# Patient Record
Sex: MALE | Race: WHITE | NOT HISPANIC OR LATINO | Employment: FULL TIME | URBAN - METROPOLITAN AREA
[De-identification: names, ages, dates, MRNs, and addresses within clinical notes are randomized per-mention and may not be internally consistent; named-entity substitution may affect disease eponyms.]

---

## 2024-08-05 ENCOUNTER — APPOINTMENT (OUTPATIENT)
Dept: OTHER | Facility: HOSPITAL | Age: 54
End: 2024-08-05
Payer: COMMERCIAL

## 2024-08-05 ENCOUNTER — HOSPITAL ENCOUNTER (OUTPATIENT)
Facility: HOSPITAL | Age: 54
Setting detail: OBSERVATION
Discharge: HOME | End: 2024-08-06
Attending: INTERNAL MEDICINE | Admitting: PEDIATRICS
Payer: COMMERCIAL

## 2024-08-05 DIAGNOSIS — E78.5 DYSLIPIDEMIA: ICD-10-CM

## 2024-08-05 DIAGNOSIS — G45.9 TIA (TRANSIENT ISCHEMIC ATTACK): ICD-10-CM

## 2024-08-05 DIAGNOSIS — Z00.6 EXAMINATION FOR NORMAL COMPARISON FOR CLINICAL RESEARCH: Primary | ICD-10-CM

## 2024-08-05 LAB — GLUCOSE BLDC GLUCOMTR-MCNC: 181 MG/DL (ref 70–130)

## 2024-08-05 PROCEDURE — G0379 DIRECT REFER HOSPITAL OBSERV: HCPCS

## 2024-08-05 PROCEDURE — 99204 OFFICE O/P NEW MOD 45 MIN: CPT

## 2024-08-05 PROCEDURE — G0378 HOSPITAL OBSERVATION PER HR: HCPCS

## 2024-08-05 PROCEDURE — 82948 REAGENT STRIP/BLOOD GLUCOSE: CPT

## 2024-08-05 PROCEDURE — 99222 1ST HOSP IP/OBS MODERATE 55: CPT | Performed by: INTERNAL MEDICINE

## 2024-08-05 RX ORDER — SODIUM CHLORIDE 9 MG/ML
40 INJECTION, SOLUTION INTRAVENOUS AS NEEDED
Status: DISCONTINUED | OUTPATIENT
Start: 2024-08-05 | End: 2024-08-05 | Stop reason: SDUPTHER

## 2024-08-05 RX ORDER — POLYETHYLENE GLYCOL 3350 17 G/17G
17 POWDER, FOR SOLUTION ORAL DAILY PRN
Status: DISCONTINUED | OUTPATIENT
Start: 2024-08-05 | End: 2024-08-06 | Stop reason: HOSPADM

## 2024-08-05 RX ORDER — ASPIRIN 81 MG/1
81 TABLET, CHEWABLE ORAL DAILY
Status: DISCONTINUED | OUTPATIENT
Start: 2024-08-06 | End: 2024-08-06 | Stop reason: HOSPADM

## 2024-08-05 RX ORDER — ASPIRIN 300 MG/1
300 SUPPOSITORY RECTAL DAILY
Status: DISCONTINUED | OUTPATIENT
Start: 2024-08-06 | End: 2024-08-06 | Stop reason: HOSPADM

## 2024-08-05 RX ORDER — BISACODYL 5 MG/1
5 TABLET, DELAYED RELEASE ORAL DAILY PRN
Status: DISCONTINUED | OUTPATIENT
Start: 2024-08-05 | End: 2024-08-06 | Stop reason: HOSPADM

## 2024-08-05 RX ORDER — SODIUM CHLORIDE 9 MG/ML
40 INJECTION, SOLUTION INTRAVENOUS AS NEEDED
Status: DISCONTINUED | OUTPATIENT
Start: 2024-08-05 | End: 2024-08-06 | Stop reason: HOSPADM

## 2024-08-05 RX ORDER — AMOXICILLIN 250 MG
2 CAPSULE ORAL 2 TIMES DAILY PRN
Status: DISCONTINUED | OUTPATIENT
Start: 2024-08-05 | End: 2024-08-06 | Stop reason: HOSPADM

## 2024-08-05 RX ORDER — SODIUM CHLORIDE 0.9 % (FLUSH) 0.9 %
10 SYRINGE (ML) INJECTION AS NEEDED
Status: DISCONTINUED | OUTPATIENT
Start: 2024-08-05 | End: 2024-08-06 | Stop reason: HOSPADM

## 2024-08-05 RX ORDER — BISACODYL 10 MG
10 SUPPOSITORY, RECTAL RECTAL DAILY PRN
Status: DISCONTINUED | OUTPATIENT
Start: 2024-08-05 | End: 2024-08-06 | Stop reason: HOSPADM

## 2024-08-05 RX ORDER — ONDANSETRON 2 MG/ML
4 INJECTION INTRAMUSCULAR; INTRAVENOUS EVERY 6 HOURS PRN
Status: DISCONTINUED | OUTPATIENT
Start: 2024-08-05 | End: 2024-08-06 | Stop reason: HOSPADM

## 2024-08-05 RX ORDER — SODIUM CHLORIDE 0.9 % (FLUSH) 0.9 %
10 SYRINGE (ML) INJECTION EVERY 12 HOURS SCHEDULED
Status: DISCONTINUED | OUTPATIENT
Start: 2024-08-05 | End: 2024-08-06 | Stop reason: HOSPADM

## 2024-08-05 RX ORDER — CLOPIDOGREL BISULFATE 75 MG/1
75 TABLET ORAL DAILY
Status: DISCONTINUED | OUTPATIENT
Start: 2024-08-06 | End: 2024-08-06 | Stop reason: HOSPADM

## 2024-08-05 RX ORDER — ACETAMINOPHEN 325 MG/1
650 TABLET ORAL EVERY 4 HOURS PRN
Status: DISCONTINUED | OUTPATIENT
Start: 2024-08-05 | End: 2024-08-06 | Stop reason: HOSPADM

## 2024-08-05 RX ORDER — ATORVASTATIN CALCIUM 40 MG/1
80 TABLET, FILM COATED ORAL NIGHTLY
Status: DISCONTINUED | OUTPATIENT
Start: 2024-08-05 | End: 2024-08-06 | Stop reason: HOSPADM

## 2024-08-05 RX ORDER — NITROGLYCERIN 0.4 MG/1
0.4 TABLET SUBLINGUAL
Status: DISCONTINUED | OUTPATIENT
Start: 2024-08-05 | End: 2024-08-06 | Stop reason: HOSPADM

## 2024-08-05 RX ORDER — ONDANSETRON 4 MG/1
4 TABLET, ORALLY DISINTEGRATING ORAL EVERY 6 HOURS PRN
Status: DISCONTINUED | OUTPATIENT
Start: 2024-08-05 | End: 2024-08-06 | Stop reason: HOSPADM

## 2024-08-05 RX ADMIN — Medication 10 ML: at 20:52

## 2024-08-05 RX ADMIN — Medication 10 ML: at 20:55

## 2024-08-05 RX ADMIN — ACETAMINOPHEN 650 MG: 325 TABLET ORAL at 21:03

## 2024-08-05 RX ADMIN — ATORVASTATIN CALCIUM 80 MG: 40 TABLET, FILM COATED ORAL at 20:52

## 2024-08-05 NOTE — H&P
[facility] Medicine Services  HISTORY AND PHYSICAL    Patient Name: Tramaine Srivastava  : 1970  MRN: [id number]  Primary Care Physician: Provider, No Known  Date of admission: 2024      Subjective   Subjective     Chief Complaint:  L sided weakness    HPI:  Tramaine Srivastava is a 54 y.o. male with history of dyslipidemia, prior tobacco chewing admitted as a stroke rule out from [facility].  He is here with his wife visiting from [place] for family reunion.  He noted to have some heaviness of his LUE, and LLE - he is able to move them but with great difficulty.  He is also complaining of a headache at the onset of symptoms that is still present.     He travels to several different countries for work, he is supposed to go to [facility].      Personal History     Past Medical History:   Diagnosis Date    Arthritis     Hyperlipidemia            Past Surgical History:   Procedure Laterality Date    APPENDECTOMY      BACK SURGERY      CERVICAL SPINE SURGERY      HAND SURGERY      KNEE ARTHROSCOPY         Family History: family history is not on file.     Social History:  reports that he has never smoked. He has never been exposed to tobacco smoke. He has quit using smokeless tobacco.  His smokeless tobacco use included chew. He reports current alcohol use of about 7.0 standard drinks of alcohol per week. He reports that he does not use drugs.  Social History     Social History Narrative    Not on file       Medications:  Available home medication information reviewed.       No Known Allergies    Objective   Objective     Vital Signs:   Temp:  [97.7 °F (36.5 °C)] 97.7 °F (36.5 °C)  Resp:  [16] 16  BP: (141)/(86) 141/86       Physical Exam  Constitutional:       Appearance: Normal appearance.   HENT:      Head: Normocephalic and atraumatic.      Mouth/Throat:      Mouth: Mucous membranes are dry.   Eyes:      Pupils: Pupils are equal, round, and reactive to light.   Cardiovascular:       Rate and Rhythm: Regular rhythm. Bradycardia present.      Heart sounds: No murmur heard.  Pulmonary:      Effort: No respiratory distress.      Breath sounds: Normal breath sounds.   Abdominal:      General: There is distension.      Palpations: Abdomen is soft.   Musculoskeletal:         General: No swelling.   Skin:     General: Skin is warm and dry.   Neurological:      Mental Status: He is alert and oriented to person, place, and time.      Comments: Left UE weakness L LE weakness - complaining of heaviness and slowness with movement              Result Review:  I have personally reviewed the results from the time of this admission to 8/5/2024 18:24 EDT and agree with these findings:  [x]  Laboratory list / accordion  []  Microbiology  [x]  Radiology  []  EKG/Telemetry   []  Cardiology/Vascular   []  Pathology  [x]  Old records  []  Other:  Most notable findings include:       LAB RESULTS:      Lab 08/05/24  1202   PROTIME 11.0   INR 1.02   APTT 25.7             Lab 08/05/24  1202   LIPASE 43                         Microbiology Results (last 10 days)       ** No results found for the last 240 hours. **            CT Outside Films    Result Date: 8/5/2024  This procedure was auto-finalized with no dictation required.    CT Head Without Contrast    Result Date: 8/5/2024  CT HEAD AND NECK ANGIO,   WITHOUT AND  WITH CONTRAST, CT HEAD PERFUSION 8/5/2024 12:03 PM HISTORY: Carotid stenosis, syncope. CT HEAD PERFUSION TECHNIQUE: Multiple axial CT images were performed from the foramen magnum to the vertex. Limited dynamic postcontrast images were obtained. Calculations of cerebral blood flow, mean transit time, cerebral blood volume were performed and evaluated. This study was performed with techniques to keep radiation doses as low as reasonably achievable, (ALARA). Individualized dose reduction techniques using automated exposure control or adjustment of mA and/or kV according to the patient size were employed.  FINDINGS: Cerebral blood flow, mean transit time, cerebral blood volume demonstrate no evidence of large core infarct. There is reversibility in the left periventricular deep white matter posteriorly.    Impression: No large vessel occlusion. Reversibility as above. This may be related to ischemia or artifact. CTA HEAD AND NECK TECHNIQUE: Thin section axial CT with IV contrast supplemented with multiplanar 3D reconstructions of the head and neck. This study was performed with techniques to keep radiation doses as low as reasonably achievable, (ALARA). Individualized dose reduction techniques using automated exposure control or adjustment of mA and/or kV according to the patient size were employed. NASCET technique utilized for stenosis evaluation. FINDINGS: HEAD CT: The ventricles are normal in size. There is no evidence of hemorrhage. No masses are identified. No extra-axial fluid is seen. The sinuses are normal. CTA: AORTIC ARCH:  Arch shows no significant narrowing. Great vessel origins are widely patent. RIGHT CAROTID:  No significant stenosis is seen of the cervical common or internal carotid artery. LEFT CAROTID:  No significant stenosis is seen of the cervical common or internal carotid artery. VERTEBRALS: The vertebrals are patent. No significant stenosis is present. The cranial circulation is unremarkable. There is no significant stenosis, aneurysm, or occlusion. IMPRESSION:  No acute process. The ED was notified at  8/5/2024 12:39 PM Images reviewed, interpreted, and dictated by DOROTHY Mcgee MD    CTA NECK / BRAIN STROKE PROTOCOL    Result Date: 8/5/2024  CT HEAD AND NECK ANGIO,   WITHOUT AND  WITH CONTRAST, CT HEAD PERFUSION 8/5/2024 12:03 PM HISTORY: Carotid stenosis, syncope. CT HEAD PERFUSION TECHNIQUE: Multiple axial CT images were performed from the foramen magnum to the vertex. Limited dynamic postcontrast images were obtained. Calculations of cerebral blood flow, mean transit time,  cerebral blood volume were performed and evaluated. This study was performed with techniques to keep radiation doses as low as reasonably achievable, (ALARA). Individualized dose reduction techniques using automated exposure control or adjustment of mA and/or kV according to the patient size were employed. FINDINGS: Cerebral blood flow, mean transit time, cerebral blood volume demonstrate no evidence of large core infarct. There is reversibility in the left periventricular deep white matter posteriorly.    Impression: No large vessel occlusion. Reversibility as above. This may be related to ischemia or artifact. CTA HEAD AND NECK TECHNIQUE: Thin section axial CT with IV contrast supplemented with multiplanar 3D reconstructions of the head and neck. This study was performed with techniques to keep radiation doses as low as reasonably achievable, (ALARA). Individualized dose reduction techniques using automated exposure control or adjustment of mA and/or kV according to the patient size were employed. NASCET technique utilized for stenosis evaluation. FINDINGS: HEAD CT: The ventricles are normal in size. There is no evidence of hemorrhage. No masses are identified. No extra-axial fluid is seen. The sinuses are normal. CTA: AORTIC ARCH:  Arch shows no significant narrowing. Great vessel origins are widely patent. RIGHT CAROTID:  No significant stenosis is seen of the cervical common or internal carotid artery. LEFT CAROTID:  No significant stenosis is seen of the cervical common or internal carotid artery. VERTEBRALS: The vertebrals are patent. No significant stenosis is present. The cranial circulation is unremarkable. There is no significant stenosis, aneurysm, or occlusion. IMPRESSION:  No acute process. The ED was notified at  8/5/2024 12:39 PM Images reviewed, interpreted, and dictated by DOROTHY Mcgee MD    CT Head Without Contrast    Result Date: 8/5/2024  HEAD CT     8/5/2024 12:01 PM HISTORY:  Left-sided numbness and weakness, headache. COMPARISON: None. TECHNIQUE: Multiple axial CT images were performed from the foramen magnum to the vertex. Individualized dose reduction techniques using automated exposure control or adjustment of mA and/or kV according to the patient size were employed. FINDINGS: There is no evidence of acute hemorrhage, mass effect, or midline shift.  The ventricles are of normal size and contour. No masses are identified. No abnormal extra-axial fluid is seen.  The paranasal sinuses and mastoid air cells are clear. The osseous structures are intact.    Impression: No evidence of acute hemorrhage, mass effect, or midline shift. Consider further evaluation with MRI. Images reviewed, interpreted, and dictated by Mario Mckeon, DO         Assessment & Plan   Assessment & Plan       TIA (transient ischemic attack)      L sided weakness  -transferred from OSH  -MRI brain pending  -CT perfusion shows cerebral blood flow, mean transit time, cerebral blood volume demonstrate no evidence of large core infarct. There is reversibility in the left periventricular deep white matter posteriorly   -check ECHO  -check lipid panel  -stroke workup  -PT/OT  -ASA, plavix per neuro      Dyslipidemia  -high dose statin        VTE Prophylaxis:  Mechanical VTE prophylaxis orders are present.          CODE STATUS:    Code Status and Medical Interventions: CPR (Attempt to Resuscitate); Full Support   Ordered at: 08/05/24 182     Level Of Support Discussed With:    Patient     Code Status (Patient has no pulse and is not breathing):    CPR (Attempt to Resuscitate)     Medical Interventions (Patient has pulse or is breathing):    Full Support       Expected Discharge   Expected discharge date/ time has not been documented.     Sara James,   08/05/24

## 2024-08-05 NOTE — CONSULTS
Stroke Consult Note    Patient Name: Tramaine Srivastava   MRN: [id number]  Age: 54 y.o.  Sex: male  : 1970    Primary Care Physician: No primary care provider on file.  Referring Physician:  Finesse Armstrong MD    TIME STROKE TEAM CALLED: 1257 EST     TIME PATIENT SEEN: 1720 EST    Handedness: Right  Race:     Chief Complaint/Reason for Consultation: Left-sided weakness    HPI: Tramaine Srivastava is a 55 yo male with PMH of prior CVA () without residual effects, depression, 3 prior cervical fusions, and an S1 fusion who presented to [facility] ED with complaints of speech difficulty, left-sided weakness and some paresthesia.  ED provider contacted [facility] stroke team for further evaluation and workup.  Per discussion with ED physician, patient's symptoms had resolved to baseline at the time of his exam with a recorded NIH of 0.  It was reported that he had had a similar event approximately 1 month ago with symptoms that also resolved.  Outside hospital CT head negative for hemorrhage or acute abnormality, CTA reported with no LVO or significant stenosis, CT perfusion reported with a reversible defect in left periventricular white matter without any core infarct.  Penumbra size not reported.  In the setting of symptoms that were reportedly resolved and patient at baseline, and perfusion deficit inconsistent with left-sided symptoms, IV thrombolytics were not offered to the patient.  [facility] neurostroke agreed to transfer for the patient for further stroke workup.  Patient was loaded on 300 mg Plavix at outside hospital prior to transfer.     Patient was evaluated upon arrival.  Contrary to outside hospital report the patient states that the only symptoms that resolved was speech difficulty.  left arm and left leg weakness persisted and never resolved.  He states that he feels a heaviness to his entire left side.  Additionally patient states that he has never had an episode like  this in the past and denies having an episode 1 month ago that resolved.  He reports symptoms beginning at approximately 10 AM this morning when he turned to the side and immediately felt his left side weakness.  He cannot remember if he turned his entire body or just his neck.  He reports a headache soon after symptoms began which was worse this morning and currently had a rating of 4/10.  Patient initially thought symptoms were related to prior neck injuries however symptoms resisted he went to the emergency room.  Patient is a non-smoker but does use nicotine pouches, reported alcohol use with 1 glass of whiskey per night.  NIH 3 on my exam for left lower extremity drift and ataxic coordination on left finger-nose and left heel-to-shin.  Patient could feel extremities in both side of his face equally to touch on my exam.  He does not use any anticoagulation but does take aspirin 81 mg daily and atorvastatin 10 mg nightly.  Additional medications include trazodone for depression.     Last Known Normal Date/Time: 1000 EST     Review of Systems   Constitutional:  Negative for fatigue and fever.   HENT:  Negative for congestion, nosebleeds and voice change.    Eyes:  Negative for photophobia and visual disturbance.   Respiratory:  Negative for shortness of breath.    Cardiovascular:  Negative for chest pain and palpitations.   Gastrointestinal:  Negative for blood in stool, diarrhea and vomiting.   Genitourinary: Negative.    Musculoskeletal:  Positive for arthralgias and back pain.   Neurological:  Positive for speech difficulty, weakness and headaches. Negative for dizziness, tremors, seizures, syncope, facial asymmetry, light-headedness and numbness.   Hematological: Negative.    Psychiatric/Behavioral: Negative.        No past medical history on file.  No past surgical history on file.  No family history on file.  Social History     Socioeconomic History    Marital status:      No Known Allergies  Prior to  Admission medications    Not on File         Temp:  [97.7 °F (36.5 °C)] 97.7 °F (36.5 °C)  Resp:  [16] 16  BP: (141)/(86) 141/86  Neurological Exam  Mental Status  Alert. Oriented to person, place and time. Oriented to person, place, and time. Speech is normal. Language is fluent with no aphasia.    Cranial Nerves  CN II: Visual fields full to confrontation.  CN III, IV, VI: Extraocular movements intact bilaterally. Normal lids and orbits bilaterally. Pupils equal round and reactive to light bilaterally.  CN V: Facial sensation is normal.  CN VII: Full and symmetric facial movement.  CN XII: Tongue midline without atrophy or fasciculations.    Motor  Normal muscle bulk throughout. No fasciculations present. Normal muscle tone. Strength is 5/5 in all four extremities except as noted.  3+/5 LLE  4/5 LUE.    Sensory  Light touch is normal in upper and lower extremities.     Coordination  Right: Finger-to-nose normal. Heel-to-shin normal.Left: Finger-to-nose abnormality: Heel-to-shin abnormality:  Ataxic coordination of the left finger-nose and left heel-to-shin .    Gait    Not observed.      Physical Exam  Constitutional:       General: He is not in acute distress.     Appearance: He is not ill-appearing.   HENT:      Head: Normocephalic and atraumatic.      Mouth/Throat:      Pharynx: Oropharynx is clear.   Eyes:      General: Lids are normal.      Extraocular Movements: Extraocular movements intact.      Pupils: Pupils are equal, round, and reactive to light.   Cardiovascular:      Rate and Rhythm: Normal rate.   Pulmonary:      Effort: Pulmonary effort is normal. No respiratory distress.   Abdominal:      General: There is no distension.      Tenderness: There is no guarding.   Musculoskeletal:      Right lower leg: No edema.      Left lower leg: No edema.   Skin:     General: Skin is warm.      Findings: No lesion.   Neurological:      Mental Status: He is alert and oriented to person, place, and time.      Motor:  Weakness present.   Psychiatric:         Mood and Affect: Mood normal.         Speech: Speech normal.       Acute Stroke Data    Thrombolytic Inclusion / Exclusion Criteria    Time: 17:39 EDT  Person Administering Scale: Jonathan Ibrahim PA-C    Inclusion Criteria  [x]   18 years of age or greater   []   Onset of symptoms < 4.5 hours before beginning treatment (stroke onset = time patient was last seen well or without symptoms).   []   Diagnosis of acute ischemic stroke causing measurable disabling deficit (Complete Hemianopia, Any Aphasia, Visual or Sensory Extinction, Any weakness limiting sustained effort against gravity)   []   Any remaining deficit considered potentially disabling in view of patient and practitioner   Exclusion criteria (Do not proceed with Alteplase if any are checked under exclusion criteria)  [x]   Onset unknown or GREATER than 4.5 hours   []   ICH on CT/MRI   []   CT demonstrates hypodensity representing acute or subacute infarct   []   Significant head trauma or prior stroke in the previous 3 months   []   Symptoms suggestive of subarachnoid hemorrhage   []   History of un-ruptured intracranial aneurysm GREATER than 10 mm   []   Recent intracranial or intraspinal surgery within the last 3 months   []   Arterial puncture at a non-compressible site in the previous 7 days   []   Active internal bleeding   []   Acute bleeding tendency   []   Platelet count LESS than 100,000 for known hematological diseases such as leukemia, thrombocytopenia or chronic cirrhosis   []   Current use of anticoagulant with INR GREATER than 1.7 or PT GREATER than 15 seconds, aPTT GREATER than 40 seconds   []   Heparin received within 48 hours, resulting in abnormally elevated aPTT GREATER than upper limit of normal   []   Current use of direct thrombin inhibitors or direct factor Xa inhibitors in the past 48 hours   []   Elevated blood pressure refractory to treatment (systolic GREATER than 185 mm/Hg or diastolic   GREATER than 110 mm/Hg   []   Suspected infective endocarditis and aortic arch dissection   []   Current use of therapeutic treatment dose of low-molecular-weight heparin (LMWH) within the previous 24 hours   []   Structural GI malignancy or bleed   Relative exclusion for all patients  []   Only minor non-disabling symptoms   []   Pregnancy   []   Seizure at onset with postictal residual neurological impairments   []   Major surgery or previous trauma within past 14 days   []   History of previous spontaneous ICH, intracranial neoplasm, or AV malformation   []   Postpartum (within previous 14 days)   []   Recent GI or urinary tract hemorrhage (within previous 21 days)   []   Recent acute MI (within previous 3 months)   []   History of un-ruptured intracranial aneurysm LESS than 10 mm   []   History of ruptured intracranial aneurysm   []   Blood glucose LESS than 50 mg/dL (2.7 mmol/L)   []   Dural puncture within the last 7 days   []   Known GREATER than 10 cerebral microbleeds   Additional exclusions for patients with symptoms onset between 3 and 4.5 hours.  []   Age > 80.   []   On any anticoagulants regardless of INR  >>> Warfarin (Coumadin), Heparin, Enoxaparin (Lovenox), fondaparinux (Arixtra), bivalirudin (Angiomax), Argatroban, dabigatran (Pradaxa), rivaroxaban (Xarelto), or apixaban (Eliquis)   []   Severe stroke (NIHSS > 25).   []   History of BOTH diabetes and previous ischemic stroke.   []   The risks and benefits have been discussed with the patient or family related to the administration of IV thrombolytic therapy for stroke symptoms.   []   I have discussed and reviewed the patient's case and imaging with the attending prior to IV thrombolytic therapy.   N/A Time IV thrombolytic administered       Hospital Meds:  Scheduled- [START ON 8/6/2024] aspirin, 81 mg, Oral, Daily   Or  [START ON 8/6/2024] aspirin, 300 mg, Rectal, Daily  atorvastatin, 80 mg, Oral, Nightly  [START ON 8/6/2024] clopidogrel, 75 mg,  "Oral, Daily  sodium chloride, 10 mL, Intravenous, Q12H  sodium chloride, 10 mL, Intravenous, Q12H      Infusions-     PRNs-   acetaminophen    senna-docusate sodium **AND** polyethylene glycol **AND** bisacodyl **AND** bisacodyl    nitroglycerin    ondansetron ODT **OR** ondansetron    sodium chloride    sodium chloride    sodium chloride    Functional Status Prior to Current Stroke/Asmita Score: 0    NIH Stroke Scale  Time: 17:39 EDT  Person Administering Scale: Jonathan Ibrahim PA-C    1a  Level of consciousness: 0=alert; keenly responsive   1b. LOC questions:  0=Answers both questions correctly   1c. LOC commands: 0=Performs both tasks correctly   2.  Best Gaze: 0=normal   3.  Visual: 0=No visual loss   4. Facial Palsy: 0=Normal symmetric movement   5a.  Motor left arm: 0=No drift, limb holds 90 (or 45) degrees for full 10 seconds   5b.  Motor right arm: 0=No drift, limb holds 90 (or 45) degrees for full 10 seconds   6a. motor left le=Drift, limb holds 90 (or 45) degrees but drifts down before full 10 seconds: does not hit bed   6b  Motor right le=No drift, limb holds 90 (or 45) degrees for full 10 seconds   7. Limb Ataxia: 2=Present in two limbs   8.  Sensory: 0=Normal; no sensory loss   9. Best Language:  0=No aphasia, normal   10. Dysarthria: 0=Normal   11. Extinction and Inattention: 0=No abnormality    Total:   3        Outside hospital radiology images requested for [place] however have not yet been reviewed independently by myself.    Initial reports per radiologist indicates CT head without evidence of hemorrhage or acute abnormality.   CT angiography without evidence of significant stenosis or large vessel occlusion.   CT perfusion without evidence of core infarct.  There is a \"reversibility\" of the left posterior periventricular white matter.  Penumbra size not reported.     Results Reviewed:  I have personally reviewed current lab, radiology, and data and agree with " results.    Assessment/Plan:  55 yo male with PMH of prior CVA (2011) without residual effects, depression, 3 prior cervical fusions, and an S1 fusion who presented to [facility] ED with complaints of speech difficulty, left-sided weakness and some paresthesia.  Per discussion with ED physician, patient's symptoms had resolved to baseline at the time of his exam with a recorded NIH of 0.  Outside hospital CT head negative for hemorrhage or acute abnormality, CTA reported with no LVO or significant stenosis, CT perfusion reported with a reversible defect in left periventricular white matter without any core infarct.  Penumbra size not reported.  In the setting of symptoms that were reportedly resolved and patient at baseline, and perfusion deficit inconsistent with left-sided symptoms, IV thrombolytics were not offered to the patient at outside hospital.    On arrival NIH 3 on my exam for left lower extremity drift and ataxic coordination on left finger-nose and left heel-to-shin.  Patient states these symptoms never resolved at outside hospital.    Antiplatelet PTA: ASA 81 mg  Anticoagulant PTA: none      Left-sided weakness, ataxia  -Differential includes acute ischemic stroke versus cervical radiculopathy versus recrudescence of stroke  -TIA/ischemic stroke without IV thrombolytic order set in place  -MRI pending  -TTE pending  -Continue aspirin 81 mg daily  -Patient loaded with Plavix 300 mg at OSH /continue Plavix 75 mg daily  -N.p.o. until patient passes dysphagia screening, healthy cardiac diet once screening is passed  -Activity as tolerated  -Please allow for permissive autoregulation of hypertension to ensure adequate perfusion until MRI can be evaluated.  Please call provider if SBP greater than 200  -Neurochecks per protocol, please call with any significant neurological decline  -PT/OT/SLP to evaluate  -Start atorvastatin 80 mg nightly, lipid panel with a.m. labs, goal LDL less than 70  -A1c with  a.m. labs, diabetic education consult if appropriate  -Neurology stroke will continue to follow    Plan of care discussed with patient, patient's wife, and hospital medical team.  Please call with any questions or concerns    Jonathan Ibrahim PA-C  August 5, 2024  17:39 EDT

## 2024-08-06 ENCOUNTER — APPOINTMENT (OUTPATIENT)
Dept: MRI IMAGING | Facility: HOSPITAL | Age: 54
End: 2024-08-06
Payer: COMMERCIAL

## 2024-08-06 ENCOUNTER — APPOINTMENT (OUTPATIENT)
Dept: CARDIOLOGY | Facility: HOSPITAL | Age: 54
End: 2024-08-06
Payer: COMMERCIAL

## 2024-08-06 ENCOUNTER — READMISSION MANAGEMENT (OUTPATIENT)
Dept: CALL CENTER | Facility: HOSPITAL | Age: 54
End: 2024-08-06
Payer: COMMERCIAL

## 2024-08-06 VITALS
RESPIRATION RATE: 18 BRPM | SYSTOLIC BLOOD PRESSURE: 122 MMHG | WEIGHT: 194 LBS | DIASTOLIC BLOOD PRESSURE: 74 MMHG | OXYGEN SATURATION: 96 % | BODY MASS INDEX: 28.73 KG/M2 | HEART RATE: 50 BPM | TEMPERATURE: 98 F | HEIGHT: 69 IN

## 2024-08-06 PROBLEM — E78.5 DYSLIPIDEMIA: Status: ACTIVE | Noted: 2024-08-06

## 2024-08-06 LAB
BH CV ECHO MEAS - AO MAX PG: 8.2 MMHG
BH CV ECHO MEAS - AO MEAN PG: 4 MMHG
BH CV ECHO MEAS - AO ROOT DIAM: 3 CM
BH CV ECHO MEAS - AO V2 MAX: 143 CM/SEC
BH CV ECHO MEAS - AO V2 VTI: 32.3 CM
BH CV ECHO MEAS - AVA(I,D): 2.8 CM2
BH CV ECHO MEAS - EDV(CUBED): 110.6 ML
BH CV ECHO MEAS - EDV(MOD-SP2): 102 ML
BH CV ECHO MEAS - EDV(MOD-SP4): 91.8 ML
BH CV ECHO MEAS - EF(MOD-BP): 64.5 %
BH CV ECHO MEAS - EF(MOD-SP2): 66.5 %
BH CV ECHO MEAS - EF(MOD-SP4): 64.5 %
BH CV ECHO MEAS - ESV(CUBED): 13.8 ML
BH CV ECHO MEAS - ESV(MOD-SP2): 34.2 ML
BH CV ECHO MEAS - ESV(MOD-SP4): 32.6 ML
BH CV ECHO MEAS - FS: 50 %
BH CV ECHO MEAS - IVS/LVPW: 0.82 CM
BH CV ECHO MEAS - IVSD: 0.9 CM
BH CV ECHO MEAS - LA DIMENSION: 3.6 CM
BH CV ECHO MEAS - LAT PEAK E' VEL: 15.4 CM/SEC
BH CV ECHO MEAS - LV DIASTOLIC VOL/BSA (35-75): 45 CM2
BH CV ECHO MEAS - LV MASS(C)D: 170.2 GRAMS
BH CV ECHO MEAS - LV MAX PG: 7.8 MMHG
BH CV ECHO MEAS - LV MEAN PG: 3 MMHG
BH CV ECHO MEAS - LV SYSTOLIC VOL/BSA (12-30): 16 CM2
BH CV ECHO MEAS - LV V1 MAX: 140 CM/SEC
BH CV ECHO MEAS - LV V1 VTI: 28.9 CM
BH CV ECHO MEAS - LVIDD: 4.8 CM
BH CV ECHO MEAS - LVIDS: 2.4 CM
BH CV ECHO MEAS - LVOT AREA: 3.1 CM2
BH CV ECHO MEAS - LVOT DIAM: 2 CM
BH CV ECHO MEAS - LVPWD: 1.1 CM
BH CV ECHO MEAS - MED PEAK E' VEL: 8.2 CM/SEC
BH CV ECHO MEAS - MV A MAX VEL: 60.3 CM/SEC
BH CV ECHO MEAS - MV DEC SLOPE: 385 CM/SEC2
BH CV ECHO MEAS - MV DEC TIME: 0.23 SEC
BH CV ECHO MEAS - MV E MAX VEL: 74.9 CM/SEC
BH CV ECHO MEAS - MV E/A: 1.24
BH CV ECHO MEAS - MV MAX PG: 3.7 MMHG
BH CV ECHO MEAS - MV MEAN PG: 1 MMHG
BH CV ECHO MEAS - MV P1/2T: 76.8 MSEC
BH CV ECHO MEAS - MV V2 VTI: 38.8 CM
BH CV ECHO MEAS - MVA(P1/2T): 2.9 CM2
BH CV ECHO MEAS - MVA(VTI): 2.34 CM2
BH CV ECHO MEAS - PA ACC TIME: 0.22 SEC
BH CV ECHO MEAS - RAP SYSTOLE: 3 MMHG
BH CV ECHO MEAS - RVSP: 27 MMHG
BH CV ECHO MEAS - SV(LVOT): 90.8 ML
BH CV ECHO MEAS - SV(MOD-SP2): 67.8 ML
BH CV ECHO MEAS - SV(MOD-SP4): 59.2 ML
BH CV ECHO MEAS - SVI(LVOT): 44.5 ML/M2
BH CV ECHO MEAS - SVI(MOD-SP2): 33.2 ML/M2
BH CV ECHO MEAS - SVI(MOD-SP4): 29 ML/M2
BH CV ECHO MEAS - TAPSE (>1.6): 3 CM
BH CV ECHO MEAS - TR MAX PG: 24 MMHG
BH CV ECHO MEAS - TR MAX VEL: 245 CM/SEC
BH CV ECHO MEASUREMENTS AVERAGE E/E' RATIO: 6.35
BH CV ECHO SHUNT ASSESSMENT PERFORMED (HIDDEN SCRIPTING): 1
BH CV XLRA - RV BASE: 4.5 CM
BH CV XLRA - RV LENGTH: 8.6 CM
BH CV XLRA - RV MID: 2.8 CM
BH CV XLRA - TDI S': 21.6 CM/SEC
CHOLEST SERPL-MCNC: 154 MG/DL (ref 0–200)
HBA1C MFR BLD: 4.9 % (ref 4.8–5.6)
HDLC SERPL-MCNC: 46 MG/DL (ref 40–60)
LDLC SERPL CALC-MCNC: 89 MG/DL (ref 0–100)
LDLC/HDLC SERPL: 1.89 {RATIO}
LEFT ATRIUM VOLUME INDEX: 25.6 ML/M2
TRIGL SERPL-MCNC: 105 MG/DL (ref 0–150)
VLDLC SERPL-MCNC: 19 MG/DL (ref 5–40)

## 2024-08-06 PROCEDURE — 0 GADOBENATE DIMEGLUMINE 529 MG/ML SOLUTION: Performed by: INTERNAL MEDICINE

## 2024-08-06 PROCEDURE — 99238 HOSP IP/OBS DSCHRG MGMT 30/<: CPT | Performed by: PEDIATRICS

## 2024-08-06 PROCEDURE — 80061 LIPID PANEL: CPT

## 2024-08-06 PROCEDURE — G0378 HOSPITAL OBSERVATION PER HR: HCPCS

## 2024-08-06 PROCEDURE — 93306 TTE W/DOPPLER COMPLETE: CPT

## 2024-08-06 PROCEDURE — 97165 OT EVAL LOW COMPLEX 30 MIN: CPT

## 2024-08-06 PROCEDURE — 70551 MRI BRAIN STEM W/O DYE: CPT

## 2024-08-06 PROCEDURE — A9577 INJ MULTIHANCE: HCPCS | Performed by: INTERNAL MEDICINE

## 2024-08-06 PROCEDURE — 72156 MRI NECK SPINE W/O & W/DYE: CPT

## 2024-08-06 PROCEDURE — 97161 PT EVAL LOW COMPLEX 20 MIN: CPT

## 2024-08-06 PROCEDURE — 97535 SELF CARE MNGMENT TRAINING: CPT

## 2024-08-06 PROCEDURE — 99214 OFFICE O/P EST MOD 30 MIN: CPT | Performed by: STUDENT IN AN ORGANIZED HEALTH CARE EDUCATION/TRAINING PROGRAM

## 2024-08-06 PROCEDURE — 93306 TTE W/DOPPLER COMPLETE: CPT | Performed by: INTERNAL MEDICINE

## 2024-08-06 PROCEDURE — 83036 HEMOGLOBIN GLYCOSYLATED A1C: CPT

## 2024-08-06 RX ORDER — ASPIRIN 81 MG/1
81 TABLET, CHEWABLE ORAL DAILY
Start: 2024-08-07

## 2024-08-06 RX ORDER — CLOPIDOGREL BISULFATE 75 MG/1
75 TABLET ORAL DAILY
Qty: 21 TABLET | Refills: 0 | Status: SHIPPED | OUTPATIENT
Start: 2024-08-07

## 2024-08-06 RX ORDER — ATORVASTATIN CALCIUM 80 MG/1
80 TABLET, FILM COATED ORAL NIGHTLY
Qty: 90 TABLET | Refills: 0 | Status: SHIPPED | OUTPATIENT
Start: 2024-08-06

## 2024-08-06 RX ADMIN — GADOBENATE DIMEGLUMINE 18 ML: 529 INJECTION, SOLUTION INTRAVENOUS at 02:22

## 2024-08-06 RX ADMIN — ACETAMINOPHEN 650 MG: 325 TABLET ORAL at 02:39

## 2024-08-06 RX ADMIN — Medication 10 ML: at 08:56

## 2024-08-06 RX ADMIN — ASPIRIN 81 MG 81 MG: 81 TABLET ORAL at 08:56

## 2024-08-06 RX ADMIN — CLOPIDOGREL BISULFATE 75 MG: 75 TABLET ORAL at 08:56

## 2024-08-06 NOTE — THERAPY EVALUATION
Patient Name: Tramaine Srivastava  : 1970    MRN: [id number]                              Today's Date: 2024       Admit Date: 2024    Visit Dx:     ICD-10-CM ICD-9-CM   1. Examination for normal comparison for clinical research  Z00.6 V70.7     Patient Active Problem List   Diagnosis    TIA (transient ischemic attack)     Past Medical History:   Diagnosis Date    Arthritis     Hyperlipidemia      Past Surgical History:   Procedure Laterality Date    APPENDECTOMY      BACK SURGERY      CERVICAL SPINE SURGERY      HAND SURGERY      KNEE ARTHROSCOPY        General Information       Row Name 24 1147          Physical Therapy Time and Intention    Document Type evaluation  -AB     Mode of Treatment physical therapy  -AB       Row Name 24 1147          General Information    Patient Profile Reviewed yes  -AB     Prior Level of Function independent:;all household mobility;community mobility;gait;transfer;bed mobility;ADL's;driving;work  [employer], works in security. No AD/DME at baseline  -AB     Existing Precautions/Restrictions fall;other (see comments)  mild L-sided sensory deficit, Hx of cervical fusion  -AB     Barriers to Rehab medically complex  -AB       Row Name 24 1147          Living Environment    People in Home spouse  -AB       Row Name 24 1147          Home Main Entrance    Number of Stairs, Main Entrance one  -AB     Stair Railings, Main Entrance none  -AB       Row Name 24 1147          Stairs Within Home, Primary    Stairs, Within Home, Primary all needs met on main level  -AB       Row Name 24 1147          Cognition    Orientation Status (Cognition) oriented x 4  -AB       Row Name 24 1147          Safety Issues, Functional Mobility    Safety Issues Affecting Function (Mobility) insight into deficits/self-awareness  -AB     Impairments Affecting Function (Mobility) endurance/activity tolerance;strength;sensation/sensory awareness  -AB  "              User Key  (r) = Recorded By, (t) = Taken By, (c) = Cosigned By      Initials Name Provider Type    AB Kandace Schreiber, PT Physical Therapist                   Mobility       Row Name 08/06/24 1150          Bed Mobility    Comment, (Bed Mobility) received and left UIC  -AB       Row Name 08/06/24 1150          Transfers    Comment, (Transfers) Good stability throughout. Reports \"heaviness\" of LLE.  -AB       Row Name 08/06/24 1150          Sit-Stand Transfer    Sit-Stand [place] (Transfers) supervision  -AB       Row Name 08/06/24 1150          Gait/Stairs (Locomotion)    [place] Level (Gait) contact guard;1 person assist  -AB     Assistive Device (Gait) --  no AD used  -AB     Patient was able to Ambulate yes  -AB     Distance in Feet (Gait) 240  -AB     Deviations/Abnormal Patterns (Gait) bilateral deviations;lilo decreased;gait speed decreased  -AB     [place] Level (Stairs) not tested  -AB     Comment, (Gait/Stairs) Pt ambulated with slowed lilo but otherwise normal gait mechanics. Reports increased effort and feeling of \"heaviness\" in LLE. No overt LOB or instability noted. Further activity limitedby fatigue.  -AB               User Key  (r) = Recorded By, (t) = Taken By, (c) = Cosigned By      Initials Name Provider Type    Kandace Jackson PT Physical Therapist                   Obj/Interventions       Row Name 08/06/24 1152          Range of Motion Comprehensive    General Range of Motion bilateral lower extremity ROM WNL  -AB       Row Name 08/06/24 1152          Strength Comprehensive (MMT)    General Manual Muscle Testing (MMT) Assessment lower extremity strength deficits identified  -AB     Comment, General Manual Muscle Testing (MMT) Assessment RLE grossly 5/5; LLE grossly 4/5  -AB       Row Name 08/06/24 1152          Motor Skills    Motor Skills coordination  -AB     Coordination bilateral;lower extremity;heel to shin;WFL  increased focus for LLE.  -AB       Row " Name 08/06/24 1152          Balance    Balance Assessment sitting static balance;sitting dynamic balance;standing static balance;standing dynamic balance  -AB     Static Sitting Balance independent  -AB     Dynamic Sitting Balance independent  -AB     Position, Sitting Balance unsupported;sitting in chair  -AB     Static Standing Balance supervision  -AB     Dynamic Standing Balance contact guard  -AB     Position/Device Used, Standing Balance unsupported  -AB     Balance Interventions sitting;standing;sit to stand;static;dynamic;occupation based/functional task  -AB     Comment, Balance good stability throughout  -AB       Row Name 08/06/24 1152          Sensory Assessment (Somatosensory)    Sensory Assessment (Somatosensory) LE sensation intact  Pt reports sensation is equal bilaterally  -AB               User Key  (r) = Recorded By, (t) = Taken By, (c) = Cosigned By      Initials Name Provider Type    AB Kandace Schreiber, PT Physical Therapist                   Goals/Plan       Row Name 08/06/24 1157          Bed Mobility Goal 1 (PT)    Activity/Assistive Device (Bed Mobility Goal 1, PT) sit to supine;supine to sit  -AB     [place] Level/Cues Needed (Bed Mobility Goal 1, PT) independent  -AB     Time Frame (Bed Mobility Goal 1, PT) short term goal (STG);5 days  -AB       Row Name 08/06/24 1157          Transfer Goal 1 (PT)    Activity/Assistive Device (Transfer Goal 1, PT) sit-to-stand/stand-to-sit;bed-to-chair/chair-to-bed  -AB     [place] Level/Cues Needed (Transfer Goal 1, PT) independent  -AB     Time Frame (Transfer Goal 1, PT) long term goal (LTG);10 days  -AB       Row Name 08/06/24 1157          Gait Training Goal 1 (PT)    Activity/Assistive Device (Gait Training Goal 1, PT) gait (walking locomotion)  -AB     [place] Level (Gait Training Goal 1, PT) independent  -AB     Distance (Gait Training Goal 1, PT) 500  -AB     Time Frame (Gait Training Goal 1, PT) long term goal (LTG);10 days  -AB        Row Name 08/06/24 1157          Therapy Assessment/Plan (PT)    Planned Therapy Interventions (PT) balance training;bed mobility training;gait training;home exercise program;patient/family education;postural re-education;transfer training;stretching;strengthening;ROM (range of motion)  -AB               User Key  (r) = Recorded By, (t) = Taken By, (c) = Cosigned By      Initials Name Provider Type    AB Kandace Schreiber, PT Physical Therapist                   Clinical Impression       Row Name 08/06/24 1154          Pain    Pretreatment Pain Rating 0/10 - no pain  -AB     Posttreatment Pain Rating 0/10 - no pain  -AB       Row Name 08/06/24 1154          Plan of Care Review    Plan of Care Reviewed With patient;spouse  -AB     Progress no change  -AB     Outcome Evaluation PT initial eval completed. Pt presents near baseline with decreased endurance and weakness of LLE. Coordination and sensation intact and equal for BLE. Pt ambulated 240' with CGA and no AD. Good stability throughout. Further IPPT is warrented. PT rec d/c home with assist and OPPT when medically appropriate.  -AB       Row Name 08/06/24 1154          Therapy Assessment/Plan (PT)    Patient/Family Therapy Goals Statement (PT) figure out whats going on  -AB     Rehab Potential (PT) good, to achieve stated therapy goals  -AB     Criteria for Skilled Interventions Met (PT) yes;meets criteria;skilled treatment is necessary  -AB     Therapy Frequency (PT) daily  -AB     Predicted Duration of Therapy Intervention (PT) 10 days  -AB       Row Name 08/06/24 1154          Vital Signs    Pre Systolic BP Rehab 127  -AB     Pre Treatment Diastolic BP 85  -AB     Post Systolic BP Rehab 122  -AB     Post Treatment Diastolic BP 74  -AB     Pretreatment Heart Rate (beats/min) 53  -AB     O2 Delivery Pre Treatment room air  -AB     O2 Delivery Intra Treatment room air  -AB     O2 Delivery Post Treatment room air  -AB     Pre Patient Position Sitting  -AB      Intra Patient Position Standing  -AB     Post Patient Position Sitting  -AB       Row Name 08/06/24 1154          Positioning and Restraints    Pre-Treatment Position sitting in chair/recliner  -AB     Post Treatment Position chair  -AB     In Chair reclined;notified nsg;sitting;call light within reach;with family/caregiver;legs elevated;waffle cushion  with spouse, no alarm per RN  -AB               User Key  (r) = Recorded By, (t) = Taken By, (c) = Cosigned By      Initials Name Provider Type    AB Kandace Schreiber PT Physical Therapist                   Outcome Measures       Row Name 08/06/24 1158          How much help from another person do you currently need...    Turning from your back to your side while in flat bed without using bedrails? 4  -AB     Moving from lying on back to sitting on the side of a flat bed without bedrails? 4  -AB     Moving to and from a bed to a chair (including a wheelchair)? 3  -AB     Standing up from a chair using your arms (e.g., wheelchair, bedside chair)? 3  -AB     Climbing 3-5 steps with a railing? 3  -AB     To walk in hospital room? 3  -AB     AM-PAC 6 Clicks Score (PT) 20  -AB     Highest Level of Mobility Goal 6 --> Walk 10 steps or more  -AB       Row Name 08/06/24 1158          Functional Assessment    Outcome Measure Options AM-PAC 6 Clicks Basic Mobility (PT)  -AB               User Key  (r) = Recorded By, (t) = Taken By, (c) = Cosigned By      Initials Name Provider Type    AB Kandace Schreiber PT Physical Therapist                                 Physical Therapy Education       Title: PT OT SLP Therapies (In Progress)       Topic: Physical Therapy (In Progress)       Point: Mobility training (Done)       Learning Progress Summary             Patient Acceptance, E,D, VU,DU by AB at 8/6/2024 1158                         Point: Home exercise program (Not Started)       Learner Progress:  Not documented in this visit.              Point: Body mechanics (Done)        Learning Progress Summary             Patient Acceptance, E,D, VU,DU by AB at 8/6/2024 1158                         Point: Precautions (Done)       Learning Progress Summary             Patient Acceptance, E,D, VU,DU by AB at 8/6/2024 1158                                         User Key       Initials Effective Dates Name Provider Type Discipline    AB 09/22/22 -  Kandace Schreiber, PT Physical Therapist PT                  PT Recommendation and Plan  Planned Therapy Interventions (PT): balance training, bed mobility training, gait training, home exercise program, patient/family education, postural re-education, transfer training, stretching, strengthening, ROM (range of motion)  Plan of Care Reviewed With: patient, spouse  Progress: no change  Outcome Evaluation: PT initial eval completed. Pt presents near baseline with decreased endurance and weakness of LLE. Coordination and sensation intact and equal for BLE. Pt ambulated 240' with CGA and no AD. Good stability throughout. Further IPPT is warrented. PT rec d/c home with assist and OPPT when medically appropriate.     Time Calculation:   PT Evaluation Complexity  History, PT Evaluation Complexity: 1-2 personal factors and/or comorbidities  Examination of Body Systems (PT Eval Complexity): total of 3 or more elements  Clinical Presentation (PT Evaluation Complexity): stable  Clinical Decision Making (PT Evaluation Complexity): low complexity  Overall Complexity (PT Evaluation Complexity): low complexity     PT Charges       Row Name 08/06/24 1158             Time Calculation    Start Time 1048  -AB      PT Received On 08/06/24  -AB      PT Goal Re-Cert Due Date 08/16/24  -AB         Untimed Charges    PT Eval/Re-eval Minutes 40  -AB         Total Minutes    Untimed Charges Total Minutes 40  -AB       Total Minutes 40  -AB                User Key  (r) = Recorded By, (t) = Taken By, (c) = Cosigned By      Initials Name Provider Type    AB Kandace Schreiber, PT Physical  Therapist                  Therapy Charges for Today       Code Description Service Date Service Provider Modifiers Qty    83658892058 HC PT EVAL LOW COMPLEXITY 3 8/6/2024 Kandace Schreiber, PT GP 1            PT G-Codes  Outcome Measure Options: AM-PAC 6 Clicks Basic Mobility (PT)  AM-PAC 6 Clicks Score (PT): 20  PT Discharge Summary  Anticipated Discharge Disposition (PT): home with assist, home with outpatient therapy services    Kandace Schreiber, PT  8/6/2024

## 2024-08-06 NOTE — DISCHARGE SUMMARY
[facility] Medicine Services  DISCHARGE SUMMARY    Patient Name: Tramaine Srivastava  : 1970  MRN: [id number]    Date of Admission: 2024  4:41 PM  Date of Discharge:  2024  Primary Care Physician: Provider, No Known    Consults       Date and Time Order Name Status Description    2024  5:22 PM Inpatient Neurology Consult Stroke Completed             Hospital Course     Presenting Problem: Left sided weakness    Active Hospital Problems    Diagnosis  POA    **TIA (transient ischemic attack) [G45.9]  Yes    Dyslipidemia [E78.5]  Yes      Resolved Hospital Problems   No resolved problems to display.          Hospital Course:  Tramaine Srivastava is a 54 y.o. male with a history of hypercholesterolemia who presents with left-sided weakness.  Patient was brought admitted as a code stroke.  He had a normal CT perfusion and CTA.  MRI brain also was unremarkable.  Due to upper extremity weakness he also had a MRI of his cervical spine which showed some postsurgical changes and mild degenerative changes but no high-grade foraminal stenosis.  Neurology was consulted and due to the persistent weakness recommended him to be discharged home on DAPT for 21 days and then aspirin 81 mg after that.    Discharge Follow Up Recommendations for outpatient labs/diagnostics:   Follow up with PCP    Day of Discharge     HPI:   Pt doing ok.  Still with some heaviness in LLE.  No dysphagia    Review of Systems      Vital Signs:   Temp:  [97.7 °F (36.5 °C)-98.2 °F (36.8 °C)] 98 °F (36.7 °C)  Heart Rate:  [38-54] 50  Resp:  [16-18] 18  BP: (104-141)/(65-86) 122/74      Physical Exam:  Constitutional: No acute distress, awake, alert  HENT: NCAT, mucous membranes moist  Respiratory: Clear to auscultation bilaterally, respiratory effort normal   Cardiovascular: RRR, no murmurs, rubs, or gallops  Gastrointestinal: Positive bowel sounds, soft, nontender, nondistended  Musculoskeletal: No  bilateral ankle edema  Psychiatric: Appropriate affect, cooperative  Neurologic: Oriented x 3, 4/5 strength in L LE.  Cranial Nerves grossly intact to confrontation, speech clear  Skin: No rashes      Pertinent  and/or Most Recent Results     LAB RESULTS:      Lab 08/05/24  1202   PROTIME 11.0   APTT 25.7         Lab 08/06/24  0515   HEMOGLOBIN A1C 4.90         Lab 08/05/24  1202   LIPASE 43         Lab 08/05/24  1202   PROTIME 11.0   INR 1.02         Lab 08/06/24  0515   CHOLESTEROL 154   LDL CHOL 89   HDL CHOL 46   TRIGLYCERIDES 105             Brief Urine Lab Results       None          Microbiology Results (last 10 days)       ** No results found for the last 240 hours. **            XR Outside Films    Result Date: 8/6/2024  This procedure was auto-finalized with no dictation required.    CT Outside Films    Result Date: 8/6/2024  This procedure was auto-finalized with no dictation required.    MRI Cervical Spine With & Without Contrast    Result Date: 8/6/2024  MRI CERVICAL SPINE W WO CONTRAST Date of Exam: 8/6/2024 2:05 AM EDT Indication: headache, spinal fusion, worsening arm pain.  Comparison: None available. Technique:  Routine multiplanar/multisequence sequence images of the cervical spine were obtained before and after the uneventful administration of 18 mL Multihance.  Findings: Alignment: Loss of the normal lordosis, which may be related to patient positioning or muscle spasm. Bone marrow: Normal for age. No abnormal enhancement. Vertebrae: Postoperative changes of C4-C6 anterior cervical discectomy and fusion. Multi-level degenerative changes as follows: C2-C3: No spinal canal or neuroforaminal stenosis. C3-C4: Mild facet arthropathy and uncovertebral hypertrophy. Mild bilateral neural foraminal stenosis. No spinal canal stenosis. C4-C5: Postsurgical changes. Mild facet arthropathy and uncovertebral perjury. Mild left neural foraminal stenosis. No spinal canal stenosis. C5-C6: Postsurgical changes. No  spinal canal or neuroforaminal stenosis. Mild facet arthropathy. C6-C7: Facet arthropathy. No spinal canal or neuroforaminal stenosis. C7-T1: No spinal canal or neuroforaminal stenosis. Cord: No abnormal signal. No abnormal enhancement. Extra-vertebral soft tissues: Cervical vessels demonstrate flow void. No suspicious adenopathy. No abnormal enhancement. Visualized brain: Normal. Additional comment: None.     Impression: Postsurgical changes of the cervical spine. Mild degenerative changes. No high-grade canal or foraminal stenosis. Electronically Signed: Ron Capellan MD  8/6/2024 8:33 AM EDT  Workstation ID: [id number]    MRI Brain Without Contrast    Result Date: 8/6/2024  MRI BRAIN WO CONTRAST Date of Exam: 8/6/2024 1:23 AM EDT Indication: Stroke, follow up Stroke R/O.  Comparison: None available. Technique:  Routine multiplanar/multisequence sequence images of the brain were obtained without contrast administration. Findings: No acute infarct is present on diffusion weighted sequences. Midline structures appear normal and the craniocervical junction is satisfactory. Gray-white differentiation is maintained and there is no evidence of intracranial hemorrhage, mass or mass effect. The ventricles are normal in size and configuration. The orbits are normal. The paranasal sinuses demonstrate some minimal mucosal thickening without significant fluid opacification. Intracranial arterial flow voids appear maintained.     Impression: Essentially normal noncontrast MRI of the brain. No evidence of acute ischemia, hemorrhage, mass or mass effect. Electronically Signed: Bennett Marquez MD  8/6/2024 8:21 AM EDT  Workstation ID: [id number]    CT Outside Films    Result Date: 8/5/2024  This procedure was auto-finalized with no dictation required.    CT Head Without Contrast    Result Date: 8/5/2024  CT HEAD AND NECK ANGIO,   WITHOUT AND  WITH CONTRAST, CT HEAD PERFUSION 8/5/2024 12:03 PM HISTORY: Carotid stenosis, syncope. CT  HEAD PERFUSION TECHNIQUE: Multiple axial CT images were performed from the foramen magnum to the vertex. Limited dynamic postcontrast images were obtained. Calculations of cerebral blood flow, mean transit time, cerebral blood volume were performed and evaluated. This study was performed with techniques to keep radiation doses as low as reasonably achievable, (ALARA). Individualized dose reduction techniques using automated exposure control or adjustment of mA and/or kV according to the patient size were employed. FINDINGS: Cerebral blood flow, mean transit time, cerebral blood volume demonstrate no evidence of large core infarct. There is reversibility in the left periventricular deep white matter posteriorly.    No large vessel occlusion. Reversibility as above. This may be related to ischemia or artifact. CTA HEAD AND NECK TECHNIQUE: Thin section axial CT with IV contrast supplemented with multiplanar 3D reconstructions of the head and neck. This study was performed with techniques to keep radiation doses as low as reasonably achievable, (ALARA). Individualized dose reduction techniques using automated exposure control or adjustment of mA and/or kV according to the patient size were employed. NASCET technique utilized for stenosis evaluation. FINDINGS: HEAD CT: The ventricles are normal in size. There is no evidence of hemorrhage. No masses are identified. No extra-axial fluid is seen. The sinuses are normal. CTA: AORTIC ARCH:  Arch shows no significant narrowing. Great vessel origins are widely patent. RIGHT CAROTID:  No significant stenosis is seen of the cervical common or internal carotid artery. LEFT CAROTID:  No significant stenosis is seen of the cervical common or internal carotid artery. VERTEBRALS: The vertebrals are patent. No significant stenosis is present. The cranial circulation is unremarkable. There is no significant stenosis, aneurysm, or occlusion. IMPRESSION:  No acute process. The ED was  notified at  8/5/2024 12:39 PM Images reviewed, interpreted, and dictated by DOROTHY Mcgee MD    CTA NECK / BRAIN STROKE PROTOCOL    Result Date: 8/5/2024  CT HEAD AND NECK ANGIO,   WITHOUT AND  WITH CONTRAST, CT HEAD PERFUSION 8/5/2024 12:03 PM HISTORY: Carotid stenosis, syncope. CT HEAD PERFUSION TECHNIQUE: Multiple axial CT images were performed from the foramen magnum to the vertex. Limited dynamic postcontrast images were obtained. Calculations of cerebral blood flow, mean transit time, cerebral blood volume were performed and evaluated. This study was performed with techniques to keep radiation doses as low as reasonably achievable, (ALARA). Individualized dose reduction techniques using automated exposure control or adjustment of mA and/or kV according to the patient size were employed. FINDINGS: Cerebral blood flow, mean transit time, cerebral blood volume demonstrate no evidence of large core infarct. There is reversibility in the left periventricular deep white matter posteriorly.    No large vessel occlusion. Reversibility as above. This may be related to ischemia or artifact. CTA HEAD AND NECK TECHNIQUE: Thin section axial CT with IV contrast supplemented with multiplanar 3D reconstructions of the head and neck. This study was performed with techniques to keep radiation doses as low as reasonably achievable, (ALARA). Individualized dose reduction techniques using automated exposure control or adjustment of mA and/or kV according to the patient size were employed. NASCET technique utilized for stenosis evaluation. FINDINGS: HEAD CT: The ventricles are normal in size. There is no evidence of hemorrhage. No masses are identified. No extra-axial fluid is seen. The sinuses are normal. CTA: AORTIC ARCH:  Arch shows no significant narrowing. Great vessel origins are widely patent. RIGHT CAROTID:  No significant stenosis is seen of the cervical common or internal carotid artery. LEFT CAROTID:  No  significant stenosis is seen of the cervical common or internal carotid artery. VERTEBRALS: The vertebrals are patent. No significant stenosis is present. The cranial circulation is unremarkable. There is no significant stenosis, aneurysm, or occlusion. IMPRESSION:  No acute process. The ED was notified at  8/5/2024 12:39 PM Images reviewed, interpreted, and dictated by DOROTHY Mcgee MD    CT Head Without Contrast    Result Date: 8/5/2024  HEAD CT     8/5/2024 12:01 PM HISTORY: Left-sided numbness and weakness, headache. COMPARISON: None. TECHNIQUE: Multiple axial CT images were performed from the foramen magnum to the vertex. Individualized dose reduction techniques using automated exposure control or adjustment of mA and/or kV according to the patient size were employed. FINDINGS: There is no evidence of acute hemorrhage, mass effect, or midline shift.  The ventricles are of normal size and contour. No masses are identified. No abnormal extra-axial fluid is seen.  The paranasal sinuses and mastoid air cells are clear. The osseous structures are intact.    No evidence of acute hemorrhage, mass effect, or midline shift. Consider further evaluation with MRI. Images reviewed, interpreted, and dictated by Mario Mckeon DO                 Plan for Follow-up of Pending Labs/Results:     Discharge Details        Discharge Medications        New Medications        Instructions Start Date   aspirin 81 MG chewable tablet   81 mg, Oral, Daily   Start Date: August 7, 2024     atorvastatin 80 MG tablet  Commonly known as: LIPITOR   80 mg, Oral, Nightly      clopidogrel 75 MG tablet  Commonly known as: PLAVIX   75 mg, Oral, Daily   Start Date: August 7, 2024              No Known Allergies      Discharge Disposition:  Home or Self Care    Diet:  Hospital:  Diet Order   Procedures    Diet: Regular/House; Fluid Consistency: Thin (IDDSI 0)            Activity:  Activity Instructions       Activity as Tolerated               Restrictions or Other Recommendations:  As tolerated       CODE STATUS:    Code Status and Medical Interventions: CPR (Attempt to Resuscitate); Full Support   Ordered at: 08/05/24 1824     Level Of Support Discussed With:    Patient     Code Status (Patient has no pulse and is not breathing):    CPR (Attempt to Resuscitate)     Medical Interventions (Patient has pulse or is breathing):    Full Support       No future appointments.    Additional Instructions for the Follow-ups that You Need to Schedule       Discharge Follow-up with PCP   As directed       Currently Documented PCP:    Provider, No Known    PCP Phone Number:    None     Follow Up Details: Call for appointment in about 1 week for hospital follow up                      Darlene Thomas MD  08/06/24      Time Spent on Discharge:  I spent  26  minutes on this discharge activity which included: face-to-face encounter with the patient, reviewing the data in the system, coordination of the care with the nursing staff as well as consultants, documentation, and entering orders.

## 2024-08-06 NOTE — PLAN OF CARE
Goal Outcome Evaluation:  Plan of Care Reviewed With: patient, spouse        Progress: no change  Outcome Evaluation: PT initial eval completed. Pt presents near baseline with decreased endurance and weakness of LLE. Coordination and sensation intact and equal for BLE. Pt ambulated 240' with CGA and no AD. Good stability throughout. Further IPPT is warrented. PT rec d/c home with assist and OPPT when medically appropriate.      Anticipated Discharge Disposition (PT): home with assist, home with outpatient therapy services

## 2024-08-06 NOTE — CASE MANAGEMENT/SOCIAL WORK
Discharge Planning Assessment  [facility]     Patient Name: Tramaine Srivastava  MRN: [id number]  Today's Date: 8/6/2024    Admit Date: 8/5/2024    Plan: Home   Discharge Needs Assessment       Row Name 08/06/24 1546       Living Environment    People in Home child(deandre), adult;spouse    Current Living Arrangements home    Primary Care Provided by self    Provides Primary Care For no one    Family Caregiver if Needed child(deandre), adult;spouse    Quality of Family Relationships supportive    Able to Return to Prior Arrangements yes       Resource/Environmental Concerns    Transportation Concerns none       Transition Planning    Patient/Family Anticipates Transition to home with family    Transportation Anticipated family or friend will provide       Discharge Needs Assessment    Readmission Within the Last 30 Days no previous admission in last 30 days    Equipment Currently Used at Home none                   Discharge Plan       Row Name 08/06/24 1547       Plan    Plan Home    Patient/Family in Agreement with Plan yes    Plan Comments Spoke with Mr. Srivastava and Spouse at the bedside. He lives with his Spouse and Daughter in [facility]. He is independent with ADL's. He does not use any DME. He has [employer] and  insurance. Therapy is recommending OP PT. Order placed and CM gave a copy of the order to patient to take to OP clinic of choice. Family denies any other discharge needs and is agreeable with discharge plan. Spouse will transport patient home.    Final Discharge Disposition Code 01 - home or self-care                  Continued Care and Services - Admitted Since 8/5/2024    No active coordination exists for this encounter.       Expected Discharge Date and Time       Expected Discharge Date Expected Discharge Time    Aug 6, 2024            Demographic Summary       Row Name 08/06/24 1545       General Information    Admission Type observation    Arrived From home    Referral Source  admission list    Reason for Consult discharge planning    Preferred Language English       Contact Information    Permission Granted to Share Info With                    Functional Status       Row Name 08/06/24 1546       Functional Status, IADL    Medications independent    Meal Preparation independent    Housekeeping independent    Laundry independent    Shopping independent       Mental Status    General Appearance WDL WDL                   Psychosocial    No documentation.                  Abuse/Neglect    No documentation.                  Legal    No documentation.                  Substance Abuse    No documentation.                  Patient Forms    No documentation.                     Deepika Baires RN

## 2024-08-06 NOTE — PROGRESS NOTES
Stroke Progress Note       Chief Complaint:  left side weakness     Subjective    Subjective     Subjective:  Patient is subjectively weak on that side     Objective      Temp:  [97.7 °F (36.5 °C)-98.2 °F (36.8 °C)] 98 °F (36.7 °C)  Heart Rate:  [38-54] 50  Resp:  [16-18] 18  BP: (104-141)/(65-86) 122/74    GEN: NAD, pleasant, cooperative  Eyes-show anicteric sclera, moist conjunctiva with no lid lag, no redness  Neck-trachea midline.  There is no thyromegaly.  ENMT-oropharynx clear with moist mucous membranes and good dentition.  Skin-no rash, lesions or ulcers.  Cardiovascular exam-no pedal edema, regular rate and rhythm.  CHEST: No signs of resp distress, on room air  Abdomen-no abdominal distention, nontender.  Psychiatric exam-alert oriented x3 with intact judgment and insight      NEURO    MENTAL STATUS: AAOx3, memory intact, fund of knowledge appropriate    LANG/SPEECH: Naming and repetition intact, fluent, follows 3-step commands    CRANIAL NERVES:      II: Pupils equal and reactive, no RAPD, no VF deficits, fundus(not done)      III, IV, VI: EOM intact, no gaze preference or deviation, no nystagmus.      V: normal sensation in V1, V2, and V3 segments bilaterally      VII: no asymmetry, no nasolabial fold flattening      VIII: normal hearing to speech      IX, X: normal palatal elevation, no uvular deviation      XI: 5/5 head turn and 5/5 shoulder shrug bilaterally      XII: midline tongue protrusion    MOTOR:  Normal tone throughout  5/5 muscle power in Rt shoulder abductors/adductors, elbow flexors/extensors, wrist flexors/extensors, finger abductors/adductors.  5/5 in Rt hip flexors/extensors, knee flexors/extensors, ankle dorsiflexors and plantar flexors.    5/5 muscle power in Lt shoulder abductors/adductors, elbow flexors/extensors, wrist flexors/extensors, finger abductors/adductors.  5/5 in Lt hip flexors/extensors, knee flexors/extensors, ankle dorsiflexors and plantea flexors.    REFLEXES:  no  Fall's, no clonus    SENSORY:    Normal to light touch all throughout     COORDINATION: Normal finger to nose and heel to shin, no tremor, no dysmetria    STATION: Not assessed due to patient condition    GAIT: Not assessed due to patient condition     Results Review:    I reviewed the patient's new clinical results.    Lab Results (last 24 hours)       Procedure Component Value Units Date/Time    Lipid Panel [[id number]] Collected: 08/06/24 0515    Specimen: Blood Updated: 08/06/24 0611     Total Cholesterol 154 mg/dL      Triglycerides 105 mg/dL      HDL Cholesterol 46 mg/dL      LDL Cholesterol  89 mg/dL      VLDL Cholesterol 19 mg/dL      LDL/HDL Ratio 1.89    Narrative:      Cholesterol Reference Ranges  (U.S. Department of Health and Human Services ATP III Classifications)    Desirable          <200 mg/dL  Borderline High    200-239 mg/dL  High Risk          >240 mg/dL      Triglyceride Reference Ranges  (U.S. Department of Health and Human Services ATP III Classifications)    Normal           <150 mg/dL  Borderline High  150-199 mg/dL  High             200-499 mg/dL  Very High        >500 mg/dL    HDL Reference Ranges  (U.S. Department of Health and Human Services ATP III Classifications)    Low     <40 mg/dl (major risk factor for CHD)  High    >60 mg/dl ('negative' risk factor for CHD)        LDL Reference Ranges  (U.S. Department of Health and Human Services ATP III Classifications)    Optimal          <100 mg/dL  Near Optimal     100-129 mg/dL  Borderline High  130-159 mg/dL  High             160-189 mg/dL  Very High        >189 mg/dL    Hemoglobin A1c [[id number]]  (Normal) Collected: 08/06/24 0515    Specimen: Blood Updated: 08/06/24 0548     Hemoglobin A1C 4.90 %     Narrative:      Hemoglobin A1C Ranges:    Increased Risk for Diabetes  5.7% to 6.4%  Diabetes                     >= 6.5%  Diabetic Goal                < 7.0%    POC Glucose Once [[id number]]  (Abnormal) Collected: 08/05/24 1926     Specimen: Blood Updated: 08/05/24 1928     Glucose 181 mg/dL           MRI Cervical Spine With & Without Contrast    Result Date: 8/6/2024  Impression: Postsurgical changes of the cervical spine. Mild degenerative changes. No high-grade canal or foraminal stenosis. Electronically Signed: Ron Capellan MD  8/6/2024 8:33 AM EDT  Workstation ID: [id number]    MRI Brain Without Contrast    Result Date: 8/6/2024  Impression: Essentially normal noncontrast MRI of the brain. No evidence of acute ischemia, hemorrhage, mass or mass effect. Electronically Signed: Bennett Marquez MD  8/6/2024 8:21 AM EDT  Workstation ID: [id number]    CT Head Without Contrast    Result Date: 8/5/2024  No large vessel occlusion. Reversibility as above. This may be related to ischemia or artifact. CTA HEAD AND NECK TECHNIQUE: Thin section axial CT with IV contrast supplemented with multiplanar 3D reconstructions of the head and neck. This study was performed with techniques to keep radiation doses as low as reasonably achievable, (ALARA). Individualized dose reduction techniques using automated exposure control or adjustment of mA and/or kV according to the patient size were employed. NASCET technique utilized for stenosis evaluation. FINDINGS: HEAD CT: The ventricles are normal in size. There is no evidence of hemorrhage. No masses are identified. No extra-axial fluid is seen. The sinuses are normal. CTA: AORTIC ARCH:  Arch shows no significant narrowing. Great vessel origins are widely patent. RIGHT CAROTID:  No significant stenosis is seen of the cervical common or internal carotid artery. LEFT CAROTID:  No significant stenosis is seen of the cervical common or internal carotid artery. VERTEBRALS: The vertebrals are patent. No significant stenosis is present. The cranial circulation is unremarkable. There is no significant stenosis, aneurysm, or occlusion. IMPRESSION:  No acute process. The ED was notified at  8/5/2024 12:39 PM Images  reviewed, interpreted, and dictated by DOROTHY Mcgee MD    CTA NECK / BRAIN STROKE PROTOCOL    Result Date: 8/5/2024  No large vessel occlusion. Reversibility as above. This may be related to ischemia or artifact. CTA HEAD AND NECK TECHNIQUE: Thin section axial CT with IV contrast supplemented with multiplanar 3D reconstructions of the head and neck. This study was performed with techniques to keep radiation doses as low as reasonably achievable, (ALARA). Individualized dose reduction techniques using automated exposure control or adjustment of mA and/or kV according to the patient size were employed. NASCET technique utilized for stenosis evaluation. FINDINGS: HEAD CT: The ventricles are normal in size. There is no evidence of hemorrhage. No masses are identified. No extra-axial fluid is seen. The sinuses are normal. CTA: AORTIC ARCH:  Arch shows no significant narrowing. Great vessel origins are widely patent. RIGHT CAROTID:  No significant stenosis is seen of the cervical common or internal carotid artery. LEFT CAROTID:  No significant stenosis is seen of the cervical common or internal carotid artery. VERTEBRALS: The vertebrals are patent. No significant stenosis is present. The cranial circulation is unremarkable. There is no significant stenosis, aneurysm, or occlusion. IMPRESSION:  No acute process. The ED was notified at  8/5/2024 12:39 PM Images reviewed, interpreted, and dictated by DOROTHY Mcgee MD    CT Head Without Contrast    Result Date: 8/5/2024  No evidence of acute hemorrhage, mass effect, or midline shift. Consider further evaluation with MRI. Images reviewed, interpreted, and dictated by Mario Mckeon DO             Assessment/Plan     Assessment/Plan:    Clinical stroke- diffusion negative.  -CTAs-open, no sig athero and MRI brain - no acute infarct.   -possibility of functional overlay cannot be ruled out, as on strength exam patient exam is inconsistent and distractible   -DAPT  for 21 days followed by aspirin 81 daily  -normal blood pressure goals     If TWIN is normal and cleared by therapy, patient can be discharged from stroke stand point and to be followed by neurology in [place].       Leonid Garcia MD  08/06/24  13:54 EDT

## 2024-08-06 NOTE — PLAN OF CARE
Goal Outcome Evaluation:  Plan of Care Reviewed With: patient, spouse        Progress: no change  Outcome Evaluation: Pt presents with mild RUE sensory deficit, otherwise at baseline for ADL completion. BUE and grasp weakness present but WFL for safe ADL performance, recommend OP OT (CHT) if weakness or sensory deficit worsens. Issued AE for LB dressing to mitigate baseline LB pain and reach to distal BLEs.  OT signing off, defer to PT for any mobility needs/recs. Rec d/c to home when medically appropriate.      Anticipated Discharge Disposition (OT): home

## 2024-08-06 NOTE — CONSULTS
Order noted for diabetes education per stroke protocol. A1c 4.9%. No history of diabetes per chart review. Does not qualify for OP diabetes education.

## 2024-08-06 NOTE — THERAPY EVALUATION
Patient Name: Tramaine Srivastava  : 1970    MRN: [id number]                              Today's Date: 2024       Admit Date: 2024    Visit Dx:     ICD-10-CM ICD-9-CM   1. Examination for normal comparison for clinical research  Z00.6 V70.7     Patient Active Problem List   Diagnosis    TIA (transient ischemic attack)     Past Medical History:   Diagnosis Date    Arthritis     Hyperlipidemia      Past Surgical History:   Procedure Laterality Date    APPENDECTOMY      BACK SURGERY      CERVICAL SPINE SURGERY      HAND SURGERY      KNEE ARTHROSCOPY        General Information       Row Name 24 0853          OT Time and Intention    Document Type discharge evaluation/summary  -CS     Mode of Treatment occupational therapy  -CS       Row Name 24 0853          General Information    Patient Profile Reviewed yes  -CS     Prior Level of Function independent:;all household mobility;ADL's;work  [employer], works in security. No AD/DME at baseline, bath seating available.  -CS     Existing Precautions/Restrictions other (see comments)  mild L-sided sensory deficit, Hx of cervical fusion  -CS     Barriers to Rehab medically complex  -CS       Row Name 24 0853          Living Environment    People in Home spouse  -CS       Row Name 24 0853          Home Main Entrance    Number of Stairs, Main Entrance one  -CS       Row Name 24 0853          Stairs Within Home, Primary    Number of Stairs, Within Home, Primary none  -CS       Row Name 24 0853          Cognition    Orientation Status (Cognition) oriented x 4  -CS       Row Name 24 0853          Safety Issues, Functional Mobility    Impairments Affecting Function (Mobility) grasp;sensation/sensory awareness  -CS     Comment, Safety Issues/Impairments (Mobility) diminished bilateral grasp (cervical history)  -CS               User Key  (r) = Recorded By, (t) = Taken By, (c) = Cosigned By      Initials Name  "Provider Type    CS Lai Woods, OT Occupational Therapist                     Mobility/ADL's       Row Name 08/06/24 0930          Bed Mobility    Bed Mobility supine-sit;scooting/bridging  -CS     Scooting/Bridging [place] (Bed Mobility) independent  -CS     Supine-Sit [place] (Bed Mobility) independent  -CS     Assistive Device (Bed Mobility) head of bed elevated  -CS     Comment, (Bed Mobility) appropriate sequencing intact, no dizziness reported  -CS       Row Name 08/06/24 0930          Transfers    Transfers sit-stand transfer;bed-chair transfer  -CS     Comment, (Transfers) no dizziness reported, no LOB  -CS       Row Name 08/06/24 0930          Bed-Chair Transfer    Bed-Chair [place] (Transfers) standby assist  -CS       Row Name 08/06/24 0930          Sit-Stand Transfer    Sit-Stand [place] (Transfers) supervision  -       Row Name 08/06/24 0930          Functional Mobility    Functional Mobility- Comment defer to PT for specifics, no LOB but reports LLE \"heaviness/dullness\"  -CS     Patient was able to Ambulate yes  -CS       Row Name 08/06/24 0930          Activities of Daily Living    BADL Assessment/Intervention lower body dressing;grooming;feeding;upper body dressing  -       Row Name 08/06/24 0930          Lower Body Dressing Assessment/Training    [place] Level (Lower Body Dressing) don;socks;independent  -CS     Assistive Devices (Lower Body Dressing) long-handled shoe horn;reacher  -CS     Position (Lower Body Dressing) edge of bed sitting  -CS     Comment, (Lower Body Dressing) reach to distal BLEs intact/safe, issued AE and training to mitigate back pain  -CS       Row Name 08/06/24 0930          Grooming Assessment/Training    [place] Level (Grooming) grooming skills;independent  -CS       Row Name 08/06/24 0930          Self-Feeding Assessment/Training    [place] Level (Feeding) feeding skills;liquids to mouth;prepare tray/open items;scoop food and " bring to mouth;independent  -CS     Position (Self-Feeding) sitting up in bed  -       Row Name 08/06/24 0930          Upper Body Dressing Assessment/Training    [place] Level (Upper Body Dressing) don;pull-over garment;other (see comments)  -     Comment, (Upper Body Dressing) overhead reach intact and WFL  -               User Key  (r) = Recorded By, (t) = Taken By, (c) = Cosigned By      Initials Name Provider Type     Lai Woods OT Occupational Therapist                   Obj/Interventions       Row Name 08/06/24 1023          Sensory Assessment (Somatosensory)    Sensory Assessment (Somatosensory) UE sensation intact;left UE  -     Bilateral UE Sensory Assessment proprioception;light touch localization;light touch awareness;intact;general sensation;impaired  -       Row Name 08/06/24 1023          Vision Assessment/Intervention    Visual Impairment/Limitations WFL  -       Row Name 08/06/24 1023          Range of Motion Comprehensive    General Range of Motion no range of motion deficits identified  -[facility] Name 08/06/24 1023          Strength Comprehensive (MMT)    General Manual Muscle Testing (MMT) Assessment upper extremity strength deficits identified  -     Comment, General Manual Muscle Testing (MMT) Assessment BUE grossly 4/5, symmetrical  -       Row Name 08/06/24 1023          Motor Skills    Motor Skills coordination;functional endurance  -     Coordination bilateral;upper extremity;finger to nose;bimanual skills;WFL  -     Functional Endurance O2 sats stable on RA  -       Row Name 08/06/24 1023          Balance    Balance Assessment sitting static balance;sitting dynamic balance;standing static balance;standing dynamic balance  -     Static Sitting Balance independent  -     Dynamic Sitting Balance independent  -CS     Position, Sitting Balance unsupported;sitting edge of bed  -     Static Standing Balance supervision  -     Dynamic Standing Balance  standby assist  -CS     Balance Interventions sitting;standing;sit to stand;occupation based/functional task  -CS               User Key  (r) = Recorded By, (t) = Taken By, (c) = Cosigned By      Initials Name Provider Type    CS Lai Woods, OT Occupational Therapist                   Goals/Plan    No documentation.                  Clinical Impression       Row Name 08/06/24 1024          Pain Assessment    Additional Documentation Pain Scale: FACES Pre/Post-Treatment (Group)  -       Row Name 08/06/24 1024          Pain Scale: FACES Pre/Post-Treatment    Pain: FACES Scale, Pretreatment 0-->no hurt  -CS     Posttreatment Pain Rating 0-->no hurt  -CS       Row Name 08/06/24 1024          Plan of Care Review    Plan of Care Reviewed With patient;spouse  -CS     Progress no change  -CS     Outcome Evaluation Pt presents with mild RUE sensory deficit, otherwise at baseline for ADL completion. BUE and grasp weakness present but WFL for safe ADL performance, recommend OP OT (CHT) if weakness or sensory deficit worsens. Issued AE for LB dressing to mitigate baseline LB pain and reach to distal BLEs.  OT signing off, defer to PT for any mobility needs/recs. Rec d/c to home when medically appropriate.  -       Row Name 08/06/24 1024          Therapy Assessment/Plan (OT)    Rehab Potential (OT) good, to achieve stated therapy goals  -     Criteria for Skilled Therapeutic Interventions Met (OT) yes;meets criteria;skilled treatment is necessary  -     Therapy Frequency (OT) daily  -CS       Row Name 08/06/24 1024          Therapy Plan Review/Discharge Plan (OT)    Anticipated Discharge Disposition (OT) home  -       Row Name 08/06/24 1024          Vital Signs    Pre Systolic BP Rehab 106  -CS     Pre Treatment Diastolic BP 72  -CS     Post Systolic BP Rehab 127  -CS     Post Treatment Diastolic BP 85  -CS     O2 Delivery Pre Treatment room air  -CS     O2 Delivery Intra Treatment room air  -CS     O2 Delivery  Post Treatment room air  -CS     Pre Patient Position Supine  -CS     Intra Patient Position Standing  -CS     Post Patient Position Sitting  -CS       Row Name 08/06/24 1024          Positioning and Restraints    Pre-Treatment Position in bed  -CS     Post Treatment Position chair  -CS     In Chair notified nsg;reclined;sitting;call light within reach;encouraged to call for assist;legs elevated;waffle cushion  with spouse, no alarm per RN  -CS               User Key  (r) = Recorded By, (t) = Taken By, (c) = Cosigned By      Initials Name Provider Type    CS Lai Woods, OT Occupational Therapist                   Outcome Measures    No documentation.                     OT Recommendation and Plan  Therapy Frequency (OT): daily  Plan of Care Review  Plan of Care Reviewed With: patient, spouse  Progress: no change  Outcome Evaluation: Pt presents with mild RUE sensory deficit, otherwise at baseline for ADL completion. BUE and grasp weakness present but WFL for safe ADL performance, recommend OP OT (CHT) if weakness or sensory deficit worsens. Issued AE for LB dressing to mitigate baseline LB pain and reach to distal BLEs.  OT signing off, defer to PT for any mobility needs/recs. Rec d/c to home when medically appropriate.     Time Calculation:   Evaluation Complexity (OT)  Review Occupational Profile/Medical/Therapy History Complexity: brief/low complexity  Assessment, Occupational Performance/Identification of Deficit Complexity: 1-3 performance deficits  Clinical Decision Making Complexity (OT): problem focused assessment/low complexity  Overall Complexity of Evaluation (OT): low complexity     Time Calculation- OT       Row Name 08/06/24 1031             Time Calculation- OT    OT Start Time 0808  -CS      OT Received On 08/06/24  -CS         Timed Charges    10704 - OT Self Care/Mgmt Minutes 8  -CS         Untimed Charges    OT Eval/Re-eval Minutes 47  -CS         Total Minutes    Timed Charges Total Minutes  8  -CS      Untimed Charges Total Minutes 47  -CS       Total Minutes 55  -CS                User Key  (r) = Recorded By, (t) = Taken By, (c) = Cosigned By      Initials Name Provider Type    CS Lai Woods, OT Occupational Therapist                  Therapy Charges for Today       Code Description Service Date Service Provider Modifiers Qty    [id number]  OT SELF CARE/MGMT/TRAIN EA 15 MIN 8/6/2024 Lai Woods, OT GO 1    [id number]  OT EVAL LOW COMPLEXITY 4 8/6/2024 Lai Woods OT GO 1                 Lai Woods OT  8/6/2024

## 2024-08-06 NOTE — DISCHARGE INSTRUCTIONS
Please take Aspirin 81mg daily in addition to the Plavix 75mg daily for 21 days.  After that, can decrease to only aspirin 81mg daily after that.  Recommend daily statin as well.

## 2024-08-07 NOTE — OUTREACH NOTE
Prep Survey      Flowsheet Row Responses   [facility] facility patient discharged from? [facility]   Is LACE score < 7 ? Yes   Eligibility Readm Mgmt   Discharge diagnosis TIA   Does the patient have one of the following disease processes/diagnoses(primary or secondary)? Stroke   Does the patient have Home health ordered? No   Is there a DME ordered? No   Prep survey completed? Yes            JEFFREY TOTH - Registered Nurse

## 2024-08-08 ENCOUNTER — READMISSION MANAGEMENT (OUTPATIENT)
Dept: CALL CENTER | Facility: HOSPITAL | Age: 54
End: 2024-08-08
Payer: COMMERCIAL

## 2024-08-08 NOTE — OUTREACH NOTE
Stroke Week 1 Survey      Flowsheet Row Responses   [facility] facility patient discharged from? [place]   Does the patient have one of the following disease processes/diagnoses(primary or secondary)? Stroke   Week 1 attempt successful? No   Unsuccessful attempts Attempt 1  [Attempted all numbers for patient and wife. No answer.]            JIMENEZ QUINN - Registered Nurse

## 2024-08-09 ENCOUNTER — READMISSION MANAGEMENT (OUTPATIENT)
Dept: CALL CENTER | Facility: HOSPITAL | Age: 54
End: 2024-08-09
Payer: COMMERCIAL

## 2024-08-09 NOTE — OUTREACH NOTE
Stroke Week 1 Survey      Flowsheet Row Responses   [facility] patient discharged from? [place]   Does the patient have one of the following disease processes/diagnoses(primary or secondary)? Stroke   Week 1 attempt successful? Yes   Call start time 1238   Call end time 1248   Discharge diagnosis TIA   Meds reviewed with patient/caregiver? Yes   Is the patient having any side effects they believe may be caused by any medication additions or changes? No   Does the patient have all medications ordered at discharge? Yes   Is the patient taking all medications as directed (includes completed medication regime)? Yes   Does the patient have a primary care provider?  Yes   Does the patient have an appointment with their PCP within 7 days of discharge? Yes   Has the patient kept scheduled appointments due by today? Yes   [facility] requests you follow up with them within 30 days for important follow up care. Please call [phone] to schedule this appointment. Thank you. Yes  [Pt reports that all f/u care will be in [place] where he resides.]   Comments Pt reports that he is walking into his PCP f/u appt currently.   Does the patient require any assistance with activities of daily living such as eating, bathing, dressing, walking, etc.? No   Does the patient have any residual symptoms from stroke/TIA? Yes   Residual symptoms comments Pt reports that he has residual heaviness on the Left side of his body.Pt reports that his balance & gait are steady, denies shuffling or veering to one side. Pt reports that he is able to perform all functions but his UE and LE fatigue easier.   Comments Pt reports that they have traveled back to [place] where he resides and has not been very active since OR. RN educated pt on being active/moving frequently, pt v/u. Pt denies issues at this time, speech is clear and pt answers appropriately during call with this nurse.   Did the patient receive a  copy of their discharge instructions? Yes   Nursing interventions Reviewed instructions with patient   What is the patient's perception of their health status since discharge? Improving   Nursing interventions Nurse provided patient education   Is the patient/caregiver able to teach back the risk factors for a stroke? High Cholesterol   Is the patient/caregiver able to teach back signs and symptoms related to disease process for when to call PCP? Yes   Is the patient/caregiver able to teach back signs and symptoms related to disease process for when to call 911? Yes   Is the patient/caregiver able to teach back the hierarchy of who to call/visit for symptoms/problems? PCP, Specialist, Home health nurse, Urgent Care, ED, 911 Yes   Is the patient able to teach back FAST for Stroke? B alance: Watch for sudden loss of balance, E yes: Check for vision loss, F ace: Look for an uneven smile, A rm: Check if one arm is weak, S peech: Listen for slurred speech, T evi: Call 9-1-1 right away   Week 1 call completed? Yes   Revoked No further contact(revokes)-requires comment   Call end time 1242            Farhana BARROS - Registered Nurse